# Patient Record
Sex: MALE | Race: WHITE | NOT HISPANIC OR LATINO | URBAN - NONMETROPOLITAN AREA
[De-identification: names, ages, dates, MRNs, and addresses within clinical notes are randomized per-mention and may not be internally consistent; named-entity substitution may affect disease eponyms.]

---

## 2017-01-27 ENCOUNTER — OFFICE (OUTPATIENT)
Dept: URBAN - NONMETROPOLITAN AREA CLINIC 13 | Facility: CLINIC | Age: 47
End: 2017-01-27
Payer: MEDICAID

## 2017-01-27 VITALS
HEART RATE: 75 BPM | SYSTOLIC BLOOD PRESSURE: 131 MMHG | WEIGHT: 210 LBS | HEIGHT: 66 IN | DIASTOLIC BLOOD PRESSURE: 90 MMHG

## 2017-01-27 VITALS — HEIGHT: 66 IN

## 2017-01-27 DIAGNOSIS — E11.9 TYPE 2 DIABETES MELLITUS WITHOUT COMPLICATIONS: ICD-10-CM

## 2017-01-27 DIAGNOSIS — K21.9 GASTRO-ESOPHAGEAL REFLUX DISEASE WITHOUT ESOPHAGITIS: ICD-10-CM

## 2017-01-27 DIAGNOSIS — R10.33 PERIUMBILICAL PAIN: ICD-10-CM

## 2017-01-27 DIAGNOSIS — R10.31 RIGHT LOWER QUADRANT PAIN: ICD-10-CM

## 2017-01-27 DIAGNOSIS — K59.00 CONSTIPATION, UNSPECIFIED: ICD-10-CM

## 2017-01-27 DIAGNOSIS — R13.19 OTHER DYSPHAGIA: ICD-10-CM

## 2017-01-27 DIAGNOSIS — R07.9 CHEST PAIN, UNSPECIFIED: ICD-10-CM

## 2017-01-27 DIAGNOSIS — Z79.899 OTHER LONG TERM (CURRENT) DRUG THERAPY: ICD-10-CM

## 2017-01-27 PROCEDURE — 84443 ASSAY THYROID STIM HORMONE: CPT

## 2017-01-27 PROCEDURE — 99213 OFFICE O/P EST LOW 20 MIN: CPT

## 2017-01-27 PROCEDURE — 83690 ASSAY OF LIPASE: CPT

## 2017-01-27 PROCEDURE — 82150 ASSAY OF AMYLASE: CPT

## 2017-01-27 PROCEDURE — 85025 COMPLETE CBC W/AUTO DIFF WBC: CPT

## 2017-01-27 PROCEDURE — 80053 COMPREHEN METABOLIC PANEL: CPT

## 2017-01-31 ENCOUNTER — AMBULATORY SURGICAL CENTER (OUTPATIENT)
Dept: URBAN - NONMETROPOLITAN AREA SURGERY 2 | Facility: SURGERY | Age: 47
End: 2017-01-31
Payer: MEDICAID

## 2017-01-31 ENCOUNTER — AMBULATORY SURGICAL CENTER (OUTPATIENT)
Dept: URBAN - NONMETROPOLITAN AREA SURGERY 2 | Facility: SURGERY | Age: 47
End: 2017-01-31
Payer: MEDICARE

## 2017-01-31 ENCOUNTER — OFFICE (OUTPATIENT)
Dept: URBAN - NONMETROPOLITAN AREA CLINIC 13 | Facility: CLINIC | Age: 47
End: 2017-01-31
Payer: MEDICAID

## 2017-01-31 VITALS
SYSTOLIC BLOOD PRESSURE: 115 MMHG | RESPIRATION RATE: 16 BRPM | OXYGEN SATURATION: 95 % | HEART RATE: 71 BPM | WEIGHT: 210 LBS | OXYGEN SATURATION: 97 % | SYSTOLIC BLOOD PRESSURE: 141 MMHG | HEART RATE: 75 BPM | HEIGHT: 66 IN | SYSTOLIC BLOOD PRESSURE: 131 MMHG | SYSTOLIC BLOOD PRESSURE: 118 MMHG | HEART RATE: 72 BPM | DIASTOLIC BLOOD PRESSURE: 87 MMHG | DIASTOLIC BLOOD PRESSURE: 68 MMHG | DIASTOLIC BLOOD PRESSURE: 78 MMHG | DIASTOLIC BLOOD PRESSURE: 75 MMHG | DIASTOLIC BLOOD PRESSURE: 80 MMHG | DIASTOLIC BLOOD PRESSURE: 77 MMHG | SYSTOLIC BLOOD PRESSURE: 125 MMHG | RESPIRATION RATE: 20 BRPM | HEART RATE: 69 BPM | HEART RATE: 67 BPM | RESPIRATION RATE: 18 BRPM | HEART RATE: 66 BPM | SYSTOLIC BLOOD PRESSURE: 133 MMHG

## 2017-01-31 VITALS — HEIGHT: 66 IN

## 2017-01-31 DIAGNOSIS — K31.89 OTHER DISEASES OF STOMACH AND DUODENUM: ICD-10-CM

## 2017-01-31 DIAGNOSIS — K21.9 GASTRO-ESOPHAGEAL REFLUX DISEASE WITHOUT ESOPHAGITIS: ICD-10-CM

## 2017-01-31 DIAGNOSIS — K22.2 ESOPHAGEAL OBSTRUCTION: ICD-10-CM

## 2017-01-31 DIAGNOSIS — R13.10 DYSPHAGIA, UNSPECIFIED: ICD-10-CM

## 2017-01-31 DIAGNOSIS — K31.7 POLYP OF STOMACH AND DUODENUM: ICD-10-CM

## 2017-01-31 DIAGNOSIS — R12 HEARTBURN: ICD-10-CM

## 2017-01-31 DIAGNOSIS — E87.5 HYPERKALEMIA: ICD-10-CM

## 2017-01-31 LAB
COMPLETE METABOLIC: ALBUMIN: 4.6 G/DL (ref 3.5–5.2)
COMPLETE METABOLIC: ALK. PHOS: 75 U/L (ref 40–150)
COMPLETE METABOLIC: ALT: 49 U/L (ref 0–55)
COMPLETE METABOLIC: AST: 27 U/L (ref 5–34)
COMPLETE METABOLIC: BUN: 10 MG/DL (ref 7–26)
COMPLETE METABOLIC: CALCIUM: 9.6 MG/DL (ref 8.4–10.2)
COMPLETE METABOLIC: CHLORIDE: 107 MMOL/L (ref 98–107)
COMPLETE METABOLIC: CO2: 32 MEQ/L — HIGH (ref 22–29)
COMPLETE METABOLIC: CREATININE: 0.8 MG/DL (ref 0.6–1.3)
COMPLETE METABOLIC: GLUCOSE: 89 MG/DL (ref 70–99)
COMPLETE METABOLIC: POTASSIUM: 5.1 MMOL/L (ref 3.5–5.3)
COMPLETE METABOLIC: SODIUM: 143 MMOL/L (ref 136–145)
COMPLETE METABOLIC: TOTAL BILIRUBIN: 0.5 MG/DL (ref 0.2–1.2)
COMPLETE METABOLIC: TOTAL PROTEIN: 6.9 G/DL (ref 6.4–8.3)

## 2017-01-31 PROCEDURE — 80053 COMPREHEN METABOLIC PANEL: CPT | Performed by: INTERNAL MEDICINE

## 2017-01-31 PROCEDURE — 43239 EGD BIOPSY SINGLE/MULTIPLE: CPT | Mod: 59 | Performed by: INTERNAL MEDICINE

## 2017-01-31 PROCEDURE — 43248 EGD GUIDE WIRE INSERTION: CPT | Performed by: INTERNAL MEDICINE

## 2017-01-31 PROCEDURE — 36415 COLL VENOUS BLD VENIPUNCTURE: CPT | Performed by: INTERNAL MEDICINE

## 2017-01-31 PROCEDURE — 43251 EGD REMOVE LESION SNARE: CPT | Performed by: INTERNAL MEDICINE

## 2017-01-31 PROCEDURE — 00740: CPT | Mod: QS,QZ | Performed by: REGISTERED NURSE

## 2017-01-31 PROCEDURE — 00740: CPT | Mod: QZ,QS | Performed by: REGISTERED NURSE

## 2017-01-31 RX ORDER — RANITIDINE 300 MG/1
TABLET ORAL
Qty: 180 | Refills: 1 | Status: COMPLETED
End: 2019-09-20

## 2017-01-31 RX ORDER — DOCUSATE SODIUM AND SENNOSIDES 50; 8.6 MG/1; MG/1
TABLET, FILM COATED ORAL
Qty: 60 | Refills: 5 | Status: ACTIVE
Start: 2016-04-18

## 2017-01-31 RX ORDER — PANCRELIPASE 20000; 68000; 109000 [USP'U]/1; [USP'U]/1; [USP'U]/1
CAPSULE, DELAYED RELEASE ORAL
Qty: 180 | Refills: 2 | Status: COMPLETED
End: 2017-01-31

## 2017-01-31 RX ORDER — DEXLANSOPRAZOLE 60 MG/1
CAPSULE, DELAYED RELEASE ORAL
Qty: 90 | Refills: 1 | Status: COMPLETED
End: 2017-08-07

## 2017-01-31 RX ORDER — BACLOFEN 10 MG/1
TABLET ORAL
Qty: 30 | Refills: 1 | Status: COMPLETED
End: 2017-01-31

## 2017-01-31 RX ORDER — VITAMIN B COMPLEX
TABLET ORAL
Qty: 60 | Refills: 5 | Status: ACTIVE
Start: 2016-04-20

## 2017-01-31 RX ORDER — FENOFIBRATE 145 MG/1
TABLET ORAL
Qty: 30 | Refills: 5 | Status: ACTIVE
Start: 2017-01-31

## 2017-01-31 RX ORDER — POLYETHYLENE GLYCOL 3350 17 G/17G
POWDER, FOR SOLUTION ORAL
Qty: 1 | Refills: 5 | Status: COMPLETED
Start: 2016-04-18 | End: 2020-04-29

## 2017-02-04 LAB — SURGICAL PROCEDURE: PDF REPORT: (no result)

## 2017-05-01 ENCOUNTER — OFFICE (OUTPATIENT)
Dept: URBAN - NONMETROPOLITAN AREA CLINIC 13 | Facility: CLINIC | Age: 47
End: 2017-05-01
Payer: MEDICARE

## 2017-05-01 ENCOUNTER — OFFICE (OUTPATIENT)
Dept: URBAN - NONMETROPOLITAN AREA CLINIC 13 | Facility: CLINIC | Age: 47
End: 2017-05-01
Payer: MEDICAID

## 2017-05-01 VITALS
HEIGHT: 66 IN | DIASTOLIC BLOOD PRESSURE: 77 MMHG | RESPIRATION RATE: 18 BRPM | HEART RATE: 69 BPM | SYSTOLIC BLOOD PRESSURE: 133 MMHG | WEIGHT: 203 LBS

## 2017-05-01 VITALS — HEIGHT: 66 IN

## 2017-05-01 DIAGNOSIS — K21.9 GASTRO-ESOPHAGEAL REFLUX DISEASE WITHOUT ESOPHAGITIS: ICD-10-CM

## 2017-05-01 DIAGNOSIS — K76.0 FATTY (CHANGE OF) LIVER, NOT ELSEWHERE CLASSIFIED: ICD-10-CM

## 2017-05-01 DIAGNOSIS — E78.5 HYPERLIPIDEMIA, UNSPECIFIED: ICD-10-CM

## 2017-05-01 DIAGNOSIS — Z86.010 PERSONAL HISTORY OF COLONIC POLYPS: ICD-10-CM

## 2017-05-01 DIAGNOSIS — E78.1 PURE HYPERGLYCERIDEMIA: ICD-10-CM

## 2017-05-01 DIAGNOSIS — K59.00 CONSTIPATION, UNSPECIFIED: ICD-10-CM

## 2017-05-01 DIAGNOSIS — R74.0 NONSPECIFIC ELEVATION OF LEVELS OF TRANSAMINASE AND LACTIC A: ICD-10-CM

## 2017-05-01 DIAGNOSIS — R12 HEARTBURN: ICD-10-CM

## 2017-05-01 DIAGNOSIS — K31.7 POLYP OF STOMACH AND DUODENUM: ICD-10-CM

## 2017-05-01 LAB
LIVER: ALBUMIN: 4.6 G/DL (ref 3.5–5.2)
LIVER: ALK. PHOS: 77 U/L (ref 40–150)
LIVER: ALT: 53 U/L (ref 0–55)
LIVER: AST: 48 U/L — HIGH (ref 5–34)
LIVER: DIRECT BILIRUBIN: 0.2 MG/DL (ref 0–0.5)
LIVER: TOTAL BILIRUBIN: 0.5 MG/DL (ref 0.2–1.2)
LIVER: TOTAL PROTEIN: 6.8 G/DL (ref 6.4–8.3)

## 2017-05-01 PROCEDURE — 99213 OFFICE O/P EST LOW 20 MIN: CPT

## 2017-05-01 PROCEDURE — 80076 HEPATIC FUNCTION PANEL: CPT

## 2017-08-01 ENCOUNTER — OFFICE (OUTPATIENT)
Dept: URBAN - NONMETROPOLITAN AREA CLINIC 13 | Facility: CLINIC | Age: 47
End: 2017-08-01
Payer: MEDICAID

## 2017-08-01 ENCOUNTER — OFFICE (OUTPATIENT)
Dept: URBAN - NONMETROPOLITAN AREA CLINIC 13 | Facility: CLINIC | Age: 47
End: 2017-08-01
Payer: MEDICARE

## 2017-08-01 VITALS
RESPIRATION RATE: 18 BRPM | HEART RATE: 76 BPM | WEIGHT: 203 LBS | DIASTOLIC BLOOD PRESSURE: 101 MMHG | HEIGHT: 66 IN | SYSTOLIC BLOOD PRESSURE: 149 MMHG

## 2017-08-01 VITALS — HEIGHT: 66 IN

## 2017-08-01 DIAGNOSIS — Z86.010 PERSONAL HISTORY OF COLONIC POLYPS: ICD-10-CM

## 2017-08-01 DIAGNOSIS — K21.9 GASTRO-ESOPHAGEAL REFLUX DISEASE WITHOUT ESOPHAGITIS: ICD-10-CM

## 2017-08-01 DIAGNOSIS — K31.7 POLYP OF STOMACH AND DUODENUM: ICD-10-CM

## 2017-08-01 DIAGNOSIS — K76.0 FATTY (CHANGE OF) LIVER, NOT ELSEWHERE CLASSIFIED: ICD-10-CM

## 2017-08-01 DIAGNOSIS — Z79.899 OTHER LONG TERM (CURRENT) DRUG THERAPY: ICD-10-CM

## 2017-08-01 DIAGNOSIS — R12 HEARTBURN: ICD-10-CM

## 2017-08-01 DIAGNOSIS — K59.00 CONSTIPATION, UNSPECIFIED: ICD-10-CM

## 2017-08-01 LAB
CBC EMERALD: HEMATOCRIT: 41.8 % (ref 37–47)
CBC EMERALD: HEMOGLOBIN: 13.9 G/DL — LOW (ref 14–18)
CBC EMERALD: LYMPHS %: 45.7 % — HIGH (ref 20.5–40.5)
CBC EMERALD: LYMPHS ABSOLUTE: 2.8 10*3U/L (ref 1.3–2.9)
CBC EMERALD: MCH: 29.6 PG (ref 27–32)
CBC EMERALD: MCHC: 33.3 G/DL (ref 28.5–35)
CBC EMERALD: MCV: 89.1 FL (ref 84–100)
CBC EMERALD: MONOS %: 7.1 % (ref 2–10)
CBC EMERALD: MONOS ABSOLUTE: 0.4 10*3U/L (ref 0.3–3.8)
CBC EMERALD: MPV: 10.2 FL (ref 7.4–10.4)
CBC EMERALD: NEUT. %: 47.2 % (ref 43–65)
CBC EMERALD: NEUT. ABSOLUTE: 3 10*3U/L (ref 2.2–4.8)
CBC EMERALD: PLATELETS: 184 10*3U/L (ref 130–440)
CBC EMERALD: RBC: 4.7 10*6U/L (ref 4.2–5.4)
CBC EMERALD: RDW: 13.4 % (ref 11.5–15.5)
CBC EMERALD: WBC: 6.2 10*3U/L (ref 4.5–10.5)

## 2017-08-01 PROCEDURE — 85025 COMPLETE CBC W/AUTO DIFF WBC: CPT

## 2017-08-01 PROCEDURE — 76705 ECHO EXAM OF ABDOMEN: CPT

## 2017-08-01 PROCEDURE — 99214 OFFICE O/P EST MOD 30 MIN: CPT | Mod: 25

## 2017-08-07 ENCOUNTER — AMBULATORY SURGICAL CENTER (OUTPATIENT)
Dept: URBAN - NONMETROPOLITAN AREA SURGERY 2 | Facility: SURGERY | Age: 47
End: 2017-08-07
Payer: MEDICARE

## 2017-08-07 ENCOUNTER — AMBULATORY SURGICAL CENTER (OUTPATIENT)
Dept: URBAN - NONMETROPOLITAN AREA SURGERY 2 | Facility: SURGERY | Age: 47
End: 2017-08-07
Payer: MEDICAID

## 2017-08-07 VITALS
SYSTOLIC BLOOD PRESSURE: 145 MMHG | SYSTOLIC BLOOD PRESSURE: 149 MMHG | DIASTOLIC BLOOD PRESSURE: 92 MMHG | SYSTOLIC BLOOD PRESSURE: 160 MMHG | OXYGEN SATURATION: 98 % | HEART RATE: 70 BPM | HEART RATE: 72 BPM | DIASTOLIC BLOOD PRESSURE: 80 MMHG | OXYGEN SATURATION: 99 % | HEART RATE: 62 BPM | HEART RATE: 68 BPM | RESPIRATION RATE: 18 BRPM | DIASTOLIC BLOOD PRESSURE: 89 MMHG | SYSTOLIC BLOOD PRESSURE: 132 MMHG | RESPIRATION RATE: 20 BRPM | HEIGHT: 66 IN | WEIGHT: 203 LBS | DIASTOLIC BLOOD PRESSURE: 104 MMHG | OXYGEN SATURATION: 94 % | SYSTOLIC BLOOD PRESSURE: 125 MMHG | DIASTOLIC BLOOD PRESSURE: 86 MMHG | HEART RATE: 77 BPM | SYSTOLIC BLOOD PRESSURE: 127 MMHG | DIASTOLIC BLOOD PRESSURE: 77 MMHG | RESPIRATION RATE: 16 BRPM | SYSTOLIC BLOOD PRESSURE: 118 MMHG | HEART RATE: 66 BPM

## 2017-08-07 DIAGNOSIS — K25.9 GASTRIC ULCER, UNSPECIFIED AS ACUTE OR CHRONIC, WITHOUT HEMO: ICD-10-CM

## 2017-08-07 DIAGNOSIS — K31.7 POLYP OF STOMACH AND DUODENUM: ICD-10-CM

## 2017-08-07 DIAGNOSIS — K29.70 GASTRITIS, UNSPECIFIED, WITHOUT BLEEDING: ICD-10-CM

## 2017-08-07 DIAGNOSIS — K22.70 BARRETT'S ESOPHAGUS WITHOUT DYSPLASIA: ICD-10-CM

## 2017-08-07 PROBLEM — D36.9: Status: ACTIVE | Noted: 2017-08-07

## 2017-08-07 PROCEDURE — 00740: CPT | Mod: QS,QZ

## 2017-08-07 PROCEDURE — 43239 EGD BIOPSY SINGLE/MULTIPLE: CPT | Mod: 59 | Performed by: INTERNAL MEDICINE

## 2017-08-07 PROCEDURE — 43251 EGD REMOVE LESION SNARE: CPT | Performed by: INTERNAL MEDICINE

## 2017-08-07 RX ORDER — FENOFIBRATE 145 MG/1
TABLET ORAL
Qty: 90 | Refills: 1 | Status: COMPLETED
Start: 2017-08-07 | End: 2018-01-26

## 2017-08-07 RX ORDER — POLYETHYLENE GLYCOL 3350 17 G/17G
POWDER, FOR SOLUTION ORAL
Qty: 1 | Refills: 5 | Status: COMPLETED
Start: 2016-04-18 | End: 2020-04-29

## 2017-08-07 RX ORDER — DEXLANSOPRAZOLE 60 MG/1
CAPSULE, DELAYED RELEASE ORAL
Qty: 90 | Refills: 1 | Status: COMPLETED
End: 2017-08-07

## 2017-08-07 RX ORDER — VITAMIN B COMPLEX
TABLET ORAL
Qty: 60 | Refills: 5 | Status: ACTIVE
Start: 2016-04-20

## 2017-08-07 RX ORDER — OMEPRAZOLE 40 MG/1
CAPSULE, DELAYED RELEASE ORAL
Qty: 30 | Refills: 0 | Status: ACTIVE
Start: 2017-08-07

## 2017-08-07 RX ORDER — DOCUSATE SODIUM AND SENNOSIDES 50; 8.6 MG/1; MG/1
TABLET, FILM COATED ORAL
Qty: 60 | Refills: 5 | Status: ACTIVE
Start: 2016-04-18

## 2017-08-07 RX ORDER — RANITIDINE 300 MG/1
TABLET ORAL
Qty: 180 | Refills: 1 | Status: COMPLETED
End: 2019-09-20

## 2017-08-11 LAB — SURGICAL PROCEDURE: PDF REPORT: (no result)

## 2018-01-24 ENCOUNTER — OFFICE (OUTPATIENT)
Dept: URBAN - NONMETROPOLITAN AREA CLINIC 13 | Facility: CLINIC | Age: 48
End: 2018-01-24
Payer: MEDICAID

## 2018-01-24 ENCOUNTER — OFFICE (OUTPATIENT)
Dept: URBAN - NONMETROPOLITAN AREA CLINIC 13 | Facility: CLINIC | Age: 48
End: 2018-01-24
Payer: MEDICARE

## 2018-01-24 VITALS
DIASTOLIC BLOOD PRESSURE: 76 MMHG | HEART RATE: 63 BPM | SYSTOLIC BLOOD PRESSURE: 117 MMHG | HEIGHT: 66 IN | WEIGHT: 206 LBS

## 2018-01-24 VITALS — HEIGHT: 66 IN

## 2018-01-24 DIAGNOSIS — K31.7 POLYP OF STOMACH AND DUODENUM: ICD-10-CM

## 2018-01-24 DIAGNOSIS — K21.9 GASTRO-ESOPHAGEAL REFLUX DISEASE WITHOUT ESOPHAGITIS: ICD-10-CM

## 2018-01-24 DIAGNOSIS — E66.3 OVERWEIGHT: ICD-10-CM

## 2018-01-24 DIAGNOSIS — K22.70 BARRETT'S ESOPHAGUS WITHOUT DYSPLASIA: ICD-10-CM

## 2018-01-24 DIAGNOSIS — K86.81 EXOCRINE PANCREATIC INSUFFICIENCY: ICD-10-CM

## 2018-01-24 DIAGNOSIS — K59.00 CONSTIPATION, UNSPECIFIED: ICD-10-CM

## 2018-01-24 DIAGNOSIS — E78.1 PURE HYPERGLYCERIDEMIA: ICD-10-CM

## 2018-01-24 DIAGNOSIS — K76.0 FATTY (CHANGE OF) LIVER, NOT ELSEWHERE CLASSIFIED: ICD-10-CM

## 2018-01-24 DIAGNOSIS — Z86.010 PERSONAL HISTORY OF COLONIC POLYPS: ICD-10-CM

## 2018-01-24 DIAGNOSIS — R12 HEARTBURN: ICD-10-CM

## 2018-01-24 LAB
CBC SYSMEX: HEMATOCRIT: 41 % (ref 37–47)
CBC SYSMEX: HEMOGLOBIN: 13.9 G/DL — LOW (ref 14–18)
CBC SYSMEX: LYMPHS %: 43 % — HIGH (ref 20.5–40.5)
CBC SYSMEX: LYMPHS ABSOLUTE: 3.1 10*3U/L — HIGH (ref 1.3–2.9)
CBC SYSMEX: MCH: 30 PG (ref 27–32)
CBC SYSMEX: MCHC: 33.9 G/DL (ref 28.5–35)
CBC SYSMEX: MCV: 88.4 FL (ref 84–100)
CBC SYSMEX: MONOS %: 6.1 % (ref 2–10)
CBC SYSMEX: MONOS ABSOLUTE: 0.4 10*3U/L (ref 0.3–3.8)
CBC SYSMEX: MPV: 9.9 FL (ref 7.4–10.4)
CBC SYSMEX: NEUT. %: 50.9 % (ref 43–65)
CBC SYSMEX: NEUT. ABSOLUTE: 3.8 10*3U/L (ref 2.2–4.8)
CBC SYSMEX: PLATELETS: 214 10*3U/L (ref 130–440)
CBC SYSMEX: RBC: 4.6 10*6U/L (ref 4.2–5.4)
CBC SYSMEX: RDW: 12.6 % (ref 11.5–15.5)
CBC SYSMEX: WBC: 7.3 10*3U/L (ref 4.5–10.5)
COMPLETE METABOLIC: ALBUMIN: 4.7 G/DL (ref 3.5–5.2)
COMPLETE METABOLIC: ALK. PHOS: 74 U/L (ref 40–150)
COMPLETE METABOLIC: ALT: 26 U/L (ref 0–55)
COMPLETE METABOLIC: AST: 23 U/L (ref 5–34)
COMPLETE METABOLIC: BUN: 18 MG/DL (ref 7–26)
COMPLETE METABOLIC: CALCIUM: 10.1 MG/DL (ref 8.4–10.2)
COMPLETE METABOLIC: CHLORIDE: 106 MMOL/L (ref 98–107)
COMPLETE METABOLIC: CO2: 30 MEQ/L — HIGH (ref 22–29)
COMPLETE METABOLIC: CREATININE: 0.89 MG/DL (ref 0.57–1.25)
COMPLETE METABOLIC: GFR - AFRICAN AMERICAN: 118.5 (ref 59–200)
COMPLETE METABOLIC: GFR - NON AFRICAN AMERICAN: 97.8 (ref 59–200)
COMPLETE METABOLIC: GLUCOSE: 104 MG/DL — HIGH (ref 70–99)
COMPLETE METABOLIC: POTASSIUM: 5 MMOL/L (ref 3.5–5.3)
COMPLETE METABOLIC: SODIUM: 142 MMOL/L (ref 136–145)
COMPLETE METABOLIC: TOTAL BILIRUBIN: 0.8 MG/DL (ref 0.2–1.2)
COMPLETE METABOLIC: TOTAL PROTEIN: 7 G/DL (ref 6.4–8.3)

## 2018-01-24 PROCEDURE — 99214 OFFICE O/P EST MOD 30 MIN: CPT

## 2018-01-24 PROCEDURE — 85025 COMPLETE CBC W/AUTO DIFF WBC: CPT

## 2018-01-24 PROCEDURE — 80053 COMPREHEN METABOLIC PANEL: CPT

## 2018-01-24 RX ORDER — PANCRELIPASE 20000; 68000; 109000 [USP'U]/1; [USP'U]/1; [USP'U]/1
CAPSULE, DELAYED RELEASE ORAL
Qty: 200 | Refills: 7 | Status: COMPLETED
End: 2018-10-24

## 2018-02-02 ENCOUNTER — AMBULATORY SURGICAL CENTER (OUTPATIENT)
Dept: URBAN - NONMETROPOLITAN AREA SURGERY 2 | Facility: SURGERY | Age: 48
End: 2018-02-02
Payer: MEDICARE

## 2018-02-02 ENCOUNTER — OFFICE (OUTPATIENT)
Dept: URBAN - NONMETROPOLITAN AREA CLINIC 13 | Facility: CLINIC | Age: 48
End: 2018-02-02
Payer: MEDICAID

## 2018-02-02 ENCOUNTER — AMBULATORY SURGICAL CENTER (OUTPATIENT)
Dept: URBAN - NONMETROPOLITAN AREA SURGERY 2 | Facility: SURGERY | Age: 48
End: 2018-02-02
Payer: MEDICAID

## 2018-02-02 VITALS
SYSTOLIC BLOOD PRESSURE: 132 MMHG | RESPIRATION RATE: 20 BRPM | HEART RATE: 68 BPM | OXYGEN SATURATION: 93 % | OXYGEN SATURATION: 99 % | DIASTOLIC BLOOD PRESSURE: 74 MMHG | OXYGEN SATURATION: 98 % | SYSTOLIC BLOOD PRESSURE: 127 MMHG | HEIGHT: 66 IN | SYSTOLIC BLOOD PRESSURE: 119 MMHG | DIASTOLIC BLOOD PRESSURE: 77 MMHG | RESPIRATION RATE: 18 BRPM | HEART RATE: 66 BPM | RESPIRATION RATE: 16 BRPM | WEIGHT: 206 LBS | HEART RATE: 64 BPM | DIASTOLIC BLOOD PRESSURE: 87 MMHG | HEART RATE: 78 BPM | DIASTOLIC BLOOD PRESSURE: 93 MMHG | HEART RATE: 71 BPM | SYSTOLIC BLOOD PRESSURE: 142 MMHG | SYSTOLIC BLOOD PRESSURE: 137 MMHG | HEART RATE: 60 BPM

## 2018-02-02 DIAGNOSIS — K22.70 BARRETT'S ESOPHAGUS WITHOUT DYSPLASIA: ICD-10-CM

## 2018-02-02 DIAGNOSIS — K21.9 GASTRO-ESOPHAGEAL REFLUX DISEASE WITHOUT ESOPHAGITIS: ICD-10-CM

## 2018-02-02 DIAGNOSIS — K29.70 GASTRITIS, UNSPECIFIED, WITHOUT BLEEDING: ICD-10-CM

## 2018-02-02 DIAGNOSIS — K31.9 DISEASE OF STOMACH AND DUODENUM, UNSPECIFIED: ICD-10-CM

## 2018-02-02 DIAGNOSIS — R13.10 DYSPHAGIA, UNSPECIFIED: ICD-10-CM

## 2018-02-02 PROBLEM — D36.9: Status: ACTIVE | Noted: 2018-02-02

## 2018-02-02 PROBLEM — K92.1: Status: ACTIVE | Noted: 2018-02-02

## 2018-02-02 PROCEDURE — 43248 EGD GUIDE WIRE INSERTION: CPT | Performed by: INTERNAL MEDICINE

## 2018-02-02 PROCEDURE — 43239 EGD BIOPSY SINGLE/MULTIPLE: CPT | Mod: 59 | Performed by: INTERNAL MEDICINE

## 2018-02-02 PROCEDURE — 00731 ANES UPR GI NDSC PX NOS: CPT | Mod: QS,QZ

## 2018-02-02 PROCEDURE — 88305 TISSUE EXAM BY PATHOLOGIST: CPT | Mod: TC | Performed by: INTERNAL MEDICINE

## 2018-02-02 RX ORDER — POLYETHYLENE GLYCOL 3350 17 G/17G
POWDER, FOR SOLUTION ORAL
Qty: 1 | Refills: 5 | Status: COMPLETED
Start: 2016-04-18 | End: 2020-04-29

## 2018-02-02 RX ORDER — DOCUSATE SODIUM AND SENNOSIDES 50; 8.6 MG/1; MG/1
TABLET, FILM COATED ORAL
Qty: 60 | Refills: 5 | Status: ACTIVE
Start: 2016-04-18

## 2018-02-07 LAB — SURGICAL PROCEDURE: PDF REPORT: (no result)

## 2018-02-11 ENCOUNTER — INPATIENT HOSPITAL (OUTPATIENT)
Dept: RURAL HOSPITAL 7 | Facility: HOSPITAL | Age: 48
End: 2018-02-11
Payer: MEDICARE

## 2018-02-11 DIAGNOSIS — K57.32 DIVERTICULITIS OF LARGE INTESTINE WITHOUT PERFORATION OR ABS: ICD-10-CM

## 2018-02-11 PROCEDURE — 99222 1ST HOSP IP/OBS MODERATE 55: CPT | Performed by: INTERNAL MEDICINE

## 2018-02-11 PROCEDURE — 99253 IP/OBS CNSLTJ NEW/EST LOW 45: CPT | Performed by: INTERNAL MEDICINE

## 2018-02-11 PROCEDURE — 99218: CPT | Performed by: INTERNAL MEDICINE

## 2018-02-11 PROCEDURE — 99213 OFFICE O/P EST LOW 20 MIN: CPT | Performed by: INTERNAL MEDICINE

## 2018-02-22 ENCOUNTER — OFFICE (OUTPATIENT)
Dept: URBAN - NONMETROPOLITAN AREA CLINIC 13 | Facility: CLINIC | Age: 48
End: 2018-02-22
Payer: MEDICAID

## 2018-02-22 VITALS — HEIGHT: 66 IN

## 2018-02-22 VITALS
HEART RATE: 69 BPM | DIASTOLIC BLOOD PRESSURE: 75 MMHG | HEIGHT: 66 IN | WEIGHT: 208 LBS | SYSTOLIC BLOOD PRESSURE: 114 MMHG

## 2018-02-22 DIAGNOSIS — K21.9 GASTRO-ESOPHAGEAL REFLUX DISEASE WITHOUT ESOPHAGITIS: ICD-10-CM

## 2018-02-22 DIAGNOSIS — K59.00 CONSTIPATION, UNSPECIFIED: ICD-10-CM

## 2018-02-22 DIAGNOSIS — K57.92 DIVERTICULITIS OF INTESTINE, PART UNSPECIFIED, WITHOUT PERFO: ICD-10-CM

## 2018-02-22 DIAGNOSIS — K76.0 FATTY (CHANGE OF) LIVER, NOT ELSEWHERE CLASSIFIED: ICD-10-CM

## 2018-02-22 DIAGNOSIS — E78.1 PURE HYPERGLYCERIDEMIA: ICD-10-CM

## 2018-02-22 LAB
CBC SYSMEX: HEMATOCRIT: 38.9 % (ref 37–47)
CBC SYSMEX: HEMOGLOBIN: 13.2 G/DL — LOW (ref 14–18)
CBC SYSMEX: LYMPHS %: 45.4 % — HIGH (ref 20.5–40.5)
CBC SYSMEX: LYMPHS ABSOLUTE: 2.7 10*3U/L (ref 1.3–2.9)
CBC SYSMEX: MCH: 30.1 PG (ref 27–32)
CBC SYSMEX: MCHC: 33.9 G/DL (ref 28.5–35)
CBC SYSMEX: MCV: 88.8 FL (ref 84–100)
CBC SYSMEX: MONOS %: 8.1 % (ref 2–10)
CBC SYSMEX: MONOS ABSOLUTE: 0.5 10*3U/L (ref 0.3–3.8)
CBC SYSMEX: MPV: 9 FL (ref 7.4–10.4)
CBC SYSMEX: NEUT. %: 46.5 % (ref 43–65)
CBC SYSMEX: NEUT. ABSOLUTE: 2.8 10*3U/L (ref 2.2–4.8)
CBC SYSMEX: PLATELETS: 243 10*3U/L (ref 130–440)
CBC SYSMEX: RBC: 4.4 10*6U/L (ref 4.2–5.4)
CBC SYSMEX: RDW: 13.1 % (ref 11.5–15.5)
CBC SYSMEX: WBC: 6 10*3U/L (ref 4.5–10.5)
COMPLETE METABOLIC: ALBUMIN: 4.2 G/DL (ref 3.5–5.2)
COMPLETE METABOLIC: ALK. PHOS: 70 U/L (ref 40–150)
COMPLETE METABOLIC: ALT: 25 U/L (ref 0–55)
COMPLETE METABOLIC: AST: 24 U/L (ref 5–34)
COMPLETE METABOLIC: BUN: 10 MG/DL (ref 7–26)
COMPLETE METABOLIC: CALCIUM: 9.1 MG/DL (ref 8.4–10.2)
COMPLETE METABOLIC: CHLORIDE: 114 MMOL/L — HIGH (ref 98–107)
COMPLETE METABOLIC: CO2: 30 MEQ/L — HIGH (ref 22–29)
COMPLETE METABOLIC: CREATININE: 0.8 MG/DL (ref 0.57–1.25)
COMPLETE METABOLIC: GFR - AFRICAN AMERICAN: 134.1 (ref 59–200)
COMPLETE METABOLIC: GFR - NON AFRICAN AMERICAN: 110.6 (ref 59–200)
COMPLETE METABOLIC: GLUCOSE: 98 MG/DL (ref 70–99)
COMPLETE METABOLIC: POTASSIUM: 4.8 MMOL/L (ref 3.5–5.3)
COMPLETE METABOLIC: SODIUM: 137 MMOL/L (ref 136–145)
COMPLETE METABOLIC: TOTAL BILIRUBIN: 0.4 MG/DL (ref 0.2–1.2)
COMPLETE METABOLIC: TOTAL PROTEIN: 6.5 G/DL (ref 6.4–8.3)

## 2018-02-22 PROCEDURE — 99213 OFFICE O/P EST LOW 20 MIN: CPT | Performed by: NURSE PRACTITIONER

## 2018-02-22 PROCEDURE — 85025 COMPLETE CBC W/AUTO DIFF WBC: CPT | Performed by: NURSE PRACTITIONER

## 2018-02-22 PROCEDURE — 36415 COLL VENOUS BLD VENIPUNCTURE: CPT | Performed by: NURSE PRACTITIONER

## 2018-02-22 PROCEDURE — 80053 COMPREHEN METABOLIC PANEL: CPT | Performed by: NURSE PRACTITIONER

## 2018-05-25 ENCOUNTER — OFFICE (OUTPATIENT)
Dept: URBAN - NONMETROPOLITAN AREA CLINIC 13 | Facility: CLINIC | Age: 48
End: 2018-05-25
Payer: MEDICARE

## 2018-05-25 ENCOUNTER — OFFICE (OUTPATIENT)
Dept: URBAN - NONMETROPOLITAN AREA CLINIC 13 | Facility: CLINIC | Age: 48
End: 2018-05-25
Payer: MEDICAID

## 2018-05-25 VITALS
DIASTOLIC BLOOD PRESSURE: 77 MMHG | WEIGHT: 202 LBS | HEIGHT: 66 IN | HEART RATE: 75 BPM | SYSTOLIC BLOOD PRESSURE: 120 MMHG

## 2018-05-25 VITALS — HEIGHT: 66 IN

## 2018-05-25 DIAGNOSIS — K59.00 CONSTIPATION, UNSPECIFIED: ICD-10-CM

## 2018-05-25 DIAGNOSIS — R10.12 LEFT UPPER QUADRANT PAIN: ICD-10-CM

## 2018-05-25 DIAGNOSIS — K22.70 BARRETT'S ESOPHAGUS WITHOUT DYSPLASIA: ICD-10-CM

## 2018-05-25 DIAGNOSIS — K21.9 GASTRO-ESOPHAGEAL REFLUX DISEASE WITHOUT ESOPHAGITIS: ICD-10-CM

## 2018-05-25 DIAGNOSIS — R74.0 NONSPECIFIC ELEVATION OF LEVELS OF TRANSAMINASE AND LACTIC A: ICD-10-CM

## 2018-05-25 DIAGNOSIS — K76.0 FATTY (CHANGE OF) LIVER, NOT ELSEWHERE CLASSIFIED: ICD-10-CM

## 2018-05-25 LAB
AMYLASE: 80.2 U/L (ref 25–125)
CBC SYSMEX: HEMATOCRIT: 40.7 % (ref 37–47)
CBC SYSMEX: HEMOGLOBIN: 13.8 G/DL — LOW (ref 14–18)
CBC SYSMEX: LYMPHS %: 40 % (ref 20.5–40.5)
CBC SYSMEX: LYMPHS ABSOLUTE: 3.5 10*3U/L — HIGH (ref 1.3–2.9)
CBC SYSMEX: MCH: 30.1 PG (ref 27–32)
CBC SYSMEX: MCHC: 33.9 G/DL (ref 28.5–35)
CBC SYSMEX: MCV: 88.7 FL (ref 84–100)
CBC SYSMEX: MONOS %: 5.7 % (ref 2–10)
CBC SYSMEX: MONOS ABSOLUTE: 0.5 10*3U/L (ref 0.3–3.8)
CBC SYSMEX: MPV: 10.1 FL (ref 7.4–10.4)
CBC SYSMEX: NEUT. %: 54.3 % (ref 43–67)
CBC SYSMEX: NEUT. ABSOLUTE: 4.7 10*3U/L (ref 2.2–4.8)
CBC SYSMEX: PLATELETS: 247 10*3U/L (ref 130–440)
CBC SYSMEX: RBC: 4.6 10*6U/L (ref 4.2–5.4)
CBC SYSMEX: RDW: 12.8 % (ref 11.5–15.5)
CBC SYSMEX: WBC: 8.7 10*3U/L (ref 4.5–10.5)
COMPLETE METABOLIC: ALBUMIN: 4.6 G/DL (ref 3.5–5.2)
COMPLETE METABOLIC: ALK. PHOS: 82 U/L (ref 40–150)
COMPLETE METABOLIC: ALT: 24 U/L (ref 0–55)
COMPLETE METABOLIC: AST: 18 U/L (ref 5–34)
COMPLETE METABOLIC: BUN: 15 MG/DL (ref 7–26)
COMPLETE METABOLIC: CALCIUM: 9.9 MG/DL (ref 8.4–10.2)
COMPLETE METABOLIC: CHLORIDE: 107 MMOL/L (ref 98–107)
COMPLETE METABOLIC: CO2: 29 MEQ/L (ref 22–29)
COMPLETE METABOLIC: CREATININE: 0.79 MG/DL (ref 0.57–1.25)
COMPLETE METABOLIC: GFR - AFRICAN AMERICAN: 136 (ref 59–200)
COMPLETE METABOLIC: GFR - NON AFRICAN AMERICAN: 112.2 (ref 59–200)
COMPLETE METABOLIC: GLUCOSE: 111 MG/DL — HIGH (ref 70–99)
COMPLETE METABOLIC: POTASSIUM: 5 MMOL/L (ref 3.5–5.3)
COMPLETE METABOLIC: SODIUM: 140 MMOL/L (ref 136–145)
COMPLETE METABOLIC: TOTAL BILIRUBIN: 0.4 MG/DL (ref 0.2–1.2)
COMPLETE METABOLIC: TOTAL PROTEIN: 6.9 G/DL (ref 6.4–8.3)
LIPASE: 78 U/L (ref 8–78)

## 2018-05-25 PROCEDURE — 82150 ASSAY OF AMYLASE: CPT | Performed by: NURSE PRACTITIONER

## 2018-05-25 PROCEDURE — 80053 COMPREHEN METABOLIC PANEL: CPT | Performed by: NURSE PRACTITIONER

## 2018-05-25 PROCEDURE — 83690 ASSAY OF LIPASE: CPT | Performed by: NURSE PRACTITIONER

## 2018-05-25 PROCEDURE — 36415 COLL VENOUS BLD VENIPUNCTURE: CPT | Performed by: NURSE PRACTITIONER

## 2018-05-25 PROCEDURE — 85025 COMPLETE CBC W/AUTO DIFF WBC: CPT | Performed by: NURSE PRACTITIONER

## 2018-05-25 PROCEDURE — 99213 OFFICE O/P EST LOW 20 MIN: CPT | Performed by: NURSE PRACTITIONER

## 2018-10-24 ENCOUNTER — OFFICE (OUTPATIENT)
Dept: URBAN - NONMETROPOLITAN AREA CLINIC 13 | Facility: CLINIC | Age: 48
End: 2018-10-24
Payer: MEDICAID

## 2018-10-24 VITALS — HEIGHT: 66 IN

## 2018-10-24 VITALS
HEART RATE: 77 BPM | SYSTOLIC BLOOD PRESSURE: 124 MMHG | HEIGHT: 66 IN | OXYGEN SATURATION: 98 % | DIASTOLIC BLOOD PRESSURE: 77 MMHG | WEIGHT: 198 LBS

## 2018-10-24 DIAGNOSIS — E78.1 PURE HYPERGLYCERIDEMIA: ICD-10-CM

## 2018-10-24 DIAGNOSIS — K59.00 CONSTIPATION, UNSPECIFIED: ICD-10-CM

## 2018-10-24 DIAGNOSIS — R11.0 NAUSEA: ICD-10-CM

## 2018-10-24 DIAGNOSIS — K76.0 FATTY (CHANGE OF) LIVER, NOT ELSEWHERE CLASSIFIED: ICD-10-CM

## 2018-10-24 DIAGNOSIS — R11.10 VOMITING, UNSPECIFIED: ICD-10-CM

## 2018-10-24 DIAGNOSIS — R13.19 OTHER DYSPHAGIA: ICD-10-CM

## 2018-10-24 DIAGNOSIS — Z86.010 PERSONAL HISTORY OF COLONIC POLYPS: ICD-10-CM

## 2018-10-24 DIAGNOSIS — K21.9 GASTRO-ESOPHAGEAL REFLUX DISEASE WITHOUT ESOPHAGITIS: ICD-10-CM

## 2018-10-24 DIAGNOSIS — E78.5 HYPERLIPIDEMIA, UNSPECIFIED: ICD-10-CM

## 2018-10-24 DIAGNOSIS — K22.70 BARRETT'S ESOPHAGUS WITHOUT DYSPLASIA: ICD-10-CM

## 2018-10-24 DIAGNOSIS — Z01.812 ENCOUNTER FOR PREPROCEDURAL LABORATORY EXAMINATION: ICD-10-CM

## 2018-10-24 DIAGNOSIS — E11.9 TYPE 2 DIABETES MELLITUS WITHOUT COMPLICATIONS: ICD-10-CM

## 2018-10-24 LAB
CBC SYSMEX: HEMATOCRIT: 40 % (ref 37–47)
CBC SYSMEX: HEMOGLOBIN: 13.6 G/DL — LOW (ref 14–18)
CBC SYSMEX: LYMPHS %: 38.1 % (ref 20.5–40.5)
CBC SYSMEX: LYMPHS ABSOLUTE: 2.8 10*3U/L (ref 1.3–2.9)
CBC SYSMEX: MCH: 29.9 PG (ref 27–32)
CBC SYSMEX: MCHC: 34 G/DL (ref 28.5–35)
CBC SYSMEX: MCV: 87.9 FL (ref 84–100)
CBC SYSMEX: MONOS %: 7.2 % (ref 2–10)
CBC SYSMEX: MONOS ABSOLUTE: 0.5 10*3U/L (ref 0.3–3.8)
CBC SYSMEX: MPV: 10.3 FL (ref 8.3–11.9)
CBC SYSMEX: NEUT. %: 54.7 % (ref 43–67)
CBC SYSMEX: NEUT. ABSOLUTE: 4 10*3U/L (ref 2.2–4.8)
CBC SYSMEX: PLATELETS: 228 10*3U/L (ref 130–440)
CBC SYSMEX: RBC: 4.6 10*6U/L (ref 4.2–5.4)
CBC SYSMEX: RDW: 12.6 % (ref 11.5–15.5)
CBC SYSMEX: WBC: 7.3 10*3U/L (ref 4.5–10.5)
COMPLETE METABOLIC: ALBUMIN: 4.6 G/DL (ref 3.5–5.2)
COMPLETE METABOLIC: ALK. PHOS: 80 U/L (ref 40–150)
COMPLETE METABOLIC: ALT: 24 U/L (ref 0–55)
COMPLETE METABOLIC: AST: 27 U/L (ref 5–34)
COMPLETE METABOLIC: BUN: 16 MG/DL (ref 7–26)
COMPLETE METABOLIC: CALCIUM: 10.1 MG/DL (ref 8.4–10.3)
COMPLETE METABOLIC: CHLORIDE: 105 MMOL/L (ref 98–107)
COMPLETE METABOLIC: CO2: 28 MEQ/L (ref 22–29)
COMPLETE METABOLIC: CREATININE: 0.78 MG/DL (ref 0.57–1.25)
COMPLETE METABOLIC: GFR - AFRICAN AMERICAN: 138 (ref 59–200)
COMPLETE METABOLIC: GFR - NON AFRICAN AMERICAN: 113.9 (ref 59–200)
COMPLETE METABOLIC: GLUCOSE: 88 MG/DL (ref 70–99)
COMPLETE METABOLIC: POTASSIUM: 4.1 MMOL/L (ref 3.5–5.3)
COMPLETE METABOLIC: SODIUM: 141 MMOL/L (ref 136–145)
COMPLETE METABOLIC: TOTAL BILIRUBIN: 0.6 MG/DL (ref 0.2–1.2)
COMPLETE METABOLIC: TOTAL PROTEIN: 7.4 G/DL (ref 6.4–8.3)

## 2018-10-24 PROCEDURE — 85025 COMPLETE CBC W/AUTO DIFF WBC: CPT

## 2018-10-24 PROCEDURE — 76705 ECHO EXAM OF ABDOMEN: CPT

## 2018-10-24 PROCEDURE — 80053 COMPREHEN METABOLIC PANEL: CPT

## 2018-10-24 PROCEDURE — 99214 OFFICE O/P EST MOD 30 MIN: CPT | Mod: 25

## 2018-10-24 PROCEDURE — 36415 COLL VENOUS BLD VENIPUNCTURE: CPT

## 2018-10-24 RX ORDER — PANCRELIPASE LIPASE, PANCRELIPASE PROTEASE, PANCRELIPASE AMYLASE 40000; 126000; 168000 [USP'U]/1; [USP'U]/1; [USP'U]/1
CAPSULE, DELAYED RELEASE ORAL
Qty: 90 | Refills: 0 | Status: ACTIVE
Start: 2018-10-24

## 2018-11-13 ENCOUNTER — OFFICE (OUTPATIENT)
Dept: URBAN - NONMETROPOLITAN AREA CLINIC 13 | Facility: CLINIC | Age: 48
End: 2018-11-13
Payer: MEDICAID

## 2018-11-13 ENCOUNTER — AMBULATORY SURGICAL CENTER (OUTPATIENT)
Dept: URBAN - NONMETROPOLITAN AREA SURGERY 2 | Facility: SURGERY | Age: 48
End: 2018-11-13
Payer: MEDICAID

## 2018-11-13 ENCOUNTER — AMBULATORY SURGICAL CENTER (OUTPATIENT)
Dept: URBAN - NONMETROPOLITAN AREA SURGERY 2 | Facility: SURGERY | Age: 48
End: 2018-11-13
Payer: MEDICARE

## 2018-11-13 VITALS
HEART RATE: 72 BPM | DIASTOLIC BLOOD PRESSURE: 49 MMHG | OXYGEN SATURATION: 98 % | DIASTOLIC BLOOD PRESSURE: 74 MMHG | OXYGEN SATURATION: 97 % | SYSTOLIC BLOOD PRESSURE: 111 MMHG | SYSTOLIC BLOOD PRESSURE: 119 MMHG | SYSTOLIC BLOOD PRESSURE: 126 MMHG | SYSTOLIC BLOOD PRESSURE: 132 MMHG | DIASTOLIC BLOOD PRESSURE: 69 MMHG | HEART RATE: 64 BPM | DIASTOLIC BLOOD PRESSURE: 113 MMHG | OXYGEN SATURATION: 99 % | SYSTOLIC BLOOD PRESSURE: 108 MMHG | WEIGHT: 198 LBS | RESPIRATION RATE: 20 BRPM | OXYGEN SATURATION: 100 % | HEART RATE: 62 BPM | DIASTOLIC BLOOD PRESSURE: 80 MMHG | DIASTOLIC BLOOD PRESSURE: 75 MMHG | HEART RATE: 69 BPM | HEIGHT: 66 IN | SYSTOLIC BLOOD PRESSURE: 139 MMHG | SYSTOLIC BLOOD PRESSURE: 153 MMHG | OXYGEN SATURATION: 94 % | DIASTOLIC BLOOD PRESSURE: 76 MMHG | RESPIRATION RATE: 18 BRPM | HEART RATE: 81 BPM

## 2018-11-13 DIAGNOSIS — K21.0 GASTRO-ESOPHAGEAL REFLUX DISEASE WITH ESOPHAGITIS: ICD-10-CM

## 2018-11-13 DIAGNOSIS — K29.70 GASTRITIS, UNSPECIFIED, WITHOUT BLEEDING: ICD-10-CM

## 2018-11-13 DIAGNOSIS — K31.89 OTHER DISEASES OF STOMACH AND DUODENUM: ICD-10-CM

## 2018-11-13 DIAGNOSIS — R13.10 DYSPHAGIA, UNSPECIFIED: ICD-10-CM

## 2018-11-13 DIAGNOSIS — K22.70 BARRETT'S ESOPHAGUS WITHOUT DYSPLASIA: ICD-10-CM

## 2018-11-13 PROBLEM — R11.2 NAUSEA WITH VOMITING: Status: ACTIVE | Noted: 2018-11-13

## 2018-11-13 PROCEDURE — 43239 EGD BIOPSY SINGLE/MULTIPLE: CPT | Mod: 59 | Performed by: INTERNAL MEDICINE

## 2018-11-13 PROCEDURE — 00731 ANES UPR GI NDSC PX NOS: CPT | Mod: QS,QZ

## 2018-11-13 PROCEDURE — 88305 TISSUE EXAM BY PATHOLOGIST: CPT | Mod: TC | Performed by: INTERNAL MEDICINE

## 2018-11-13 PROCEDURE — 43248 EGD GUIDE WIRE INSERTION: CPT | Performed by: INTERNAL MEDICINE

## 2018-11-13 RX ORDER — DOCUSATE SODIUM AND SENNOSIDES 50; 8.6 MG/1; MG/1
TABLET, FILM COATED ORAL
Qty: 60 | Refills: 5 | Status: ACTIVE
Start: 2016-04-18

## 2018-11-13 RX ORDER — OMEPRAZOLE 40 MG/1
CAPSULE, DELAYED RELEASE ORAL
Qty: 30 | Refills: 0 | Status: ACTIVE
Start: 2017-08-07

## 2018-11-13 RX ORDER — RANITIDINE 300 MG/1
TABLET ORAL
Qty: 180 | Refills: 1 | Status: COMPLETED
End: 2019-09-20

## 2018-11-13 RX ORDER — FENOFIBRATE 160 MG/1
TABLET, FILM COATED ORAL
Qty: 30 | Refills: 3 | Status: ACTIVE

## 2018-11-13 RX ORDER — POLYETHYLENE GLYCOL 3350 17 G/17G
POWDER, FOR SOLUTION ORAL
Qty: 1 | Refills: 5 | Status: COMPLETED
Start: 2016-04-18 | End: 2020-04-29

## 2018-11-13 RX ORDER — VITAMIN B COMPLEX
TABLET ORAL
Qty: 60 | Refills: 5 | Status: ACTIVE
Start: 2016-04-20

## 2018-11-15 LAB — SURGICAL PROCEDURE: PDF REPORT: (no result)

## 2019-02-13 ENCOUNTER — OFFICE (OUTPATIENT)
Dept: URBAN - NONMETROPOLITAN AREA CLINIC 13 | Facility: CLINIC | Age: 49
End: 2019-02-13
Payer: MEDICAID

## 2019-02-13 ENCOUNTER — OFFICE (OUTPATIENT)
Dept: URBAN - NONMETROPOLITAN AREA CLINIC 13 | Facility: CLINIC | Age: 49
End: 2019-02-13
Payer: MEDICARE

## 2019-02-13 VITALS
OXYGEN SATURATION: 96 % | HEART RATE: 64 BPM | SYSTOLIC BLOOD PRESSURE: 138 MMHG | HEIGHT: 66 IN | WEIGHT: 162 LBS | DIASTOLIC BLOOD PRESSURE: 89 MMHG

## 2019-02-13 VITALS — HEIGHT: 66 IN

## 2019-02-13 DIAGNOSIS — K52.9 NONINFECTIVE GASTROENTERITIS AND COLITIS, UNSPECIFIED: ICD-10-CM

## 2019-02-13 DIAGNOSIS — Z79.899 OTHER LONG TERM (CURRENT) DRUG THERAPY: ICD-10-CM

## 2019-02-13 DIAGNOSIS — K21.9 GASTRO-ESOPHAGEAL REFLUX DISEASE WITHOUT ESOPHAGITIS: ICD-10-CM

## 2019-02-13 DIAGNOSIS — K86.81 EXOCRINE PANCREATIC INSUFFICIENCY: ICD-10-CM

## 2019-02-13 DIAGNOSIS — K76.0 FATTY (CHANGE OF) LIVER, NOT ELSEWHERE CLASSIFIED: ICD-10-CM

## 2019-02-13 DIAGNOSIS — E78.5 HYPERLIPIDEMIA, UNSPECIFIED: ICD-10-CM

## 2019-02-13 DIAGNOSIS — Z01.812 ENCOUNTER FOR PREPROCEDURAL LABORATORY EXAMINATION: ICD-10-CM

## 2019-02-13 LAB
CBC SYSMEX: HEMATOCRIT: 42.2 % (ref 37–47)
CBC SYSMEX: HEMOGLOBIN: 14.1 G/DL (ref 14–18)
CBC SYSMEX: LYMPHS %: 45 % — HIGH (ref 20.5–40.5)
CBC SYSMEX: LYMPHS ABSOLUTE: 2.7 10*3U/L (ref 1.3–2.9)
CBC SYSMEX: MCH: 29.7 PG (ref 27–32)
CBC SYSMEX: MCHC: 33.4 G/DL (ref 28.5–35)
CBC SYSMEX: MCV: 88.8 FL (ref 84–100)
CBC SYSMEX: MONOS %: 4.4 % (ref 2–10)
CBC SYSMEX: MONOS ABSOLUTE: 0.3 10*3U/L (ref 0.3–3.8)
CBC SYSMEX: MPV: 10.9 FL (ref 8.3–11.9)
CBC SYSMEX: NEUT. %: 50.6 % (ref 43–67)
CBC SYSMEX: NEUT. ABSOLUTE: 3.1 10*3U/L (ref 2.2–4.8)
CBC SYSMEX: PLATELETS: 177 10*3U/L (ref 130–440)
CBC SYSMEX: RBC: 4.8 10*6U/L (ref 4.2–5.4)
CBC SYSMEX: RDW: 13.2 % (ref 11.5–15.5)
CBC SYSMEX: WBC: 6.1 10*3U/L (ref 4.5–10.5)
COMPLETE METABOLIC: ALBUMIN: 4.8 G/DL (ref 3.5–5.2)
COMPLETE METABOLIC: ALK. PHOS: 59 U/L (ref 40–150)
COMPLETE METABOLIC: ALT: 19 U/L (ref 0–55)
COMPLETE METABOLIC: AST: 24 U/L (ref 5–34)
COMPLETE METABOLIC: BUN: 11 MG/DL (ref 7–26)
COMPLETE METABOLIC: CALCIUM: 10.3 MG/DL (ref 8.4–10.3)
COMPLETE METABOLIC: CHLORIDE: 105 MMOL/L (ref 98–107)
COMPLETE METABOLIC: CO2: 31 MEQ/L — HIGH (ref 22–29)
COMPLETE METABOLIC: CREATININE: 0.82 MG/DL (ref 0.57–1.25)
COMPLETE METABOLIC: GFR - AFRICAN AMERICAN: 129.7 (ref 59–200)
COMPLETE METABOLIC: GFR - NON AFRICAN AMERICAN: 107 (ref 59–200)
COMPLETE METABOLIC: GLUCOSE: 79 MG/DL (ref 70–99)
COMPLETE METABOLIC: POTASSIUM: 5.2 MMOL/L (ref 3.5–5.3)
COMPLETE METABOLIC: SODIUM: 145 MMOL/L (ref 136–145)
COMPLETE METABOLIC: TOTAL BILIRUBIN: 0.4 MG/DL (ref 0.2–1.2)
COMPLETE METABOLIC: TOTAL PROTEIN: 6.8 G/DL (ref 6.4–8.3)

## 2019-02-13 PROCEDURE — 85025 COMPLETE CBC W/AUTO DIFF WBC: CPT

## 2019-02-13 PROCEDURE — 99213 OFFICE O/P EST LOW 20 MIN: CPT

## 2019-02-13 PROCEDURE — 80053 COMPREHEN METABOLIC PANEL: CPT

## 2019-02-13 RX ORDER — BISACODYL 5 MG
TABLET, DELAYED RELEASE (ENTERIC COATED) ORAL
Qty: 8 | Refills: 0 | Status: COMPLETED
Start: 2019-02-13 | End: 2019-04-22

## 2019-02-13 RX ORDER — ONDANSETRON HYDROCHLORIDE 4 MG/1
TABLET, FILM COATED ORAL
Qty: 2 | Refills: 0 | Status: COMPLETED
Start: 2019-02-13 | End: 2019-04-22

## 2019-02-13 RX ORDER — POLYETHYLENE GLYCOL 3350, SODIUM SULFATE ANHYDROUS, SODIUM BICARBONATE, SODIUM CHLORIDE, POTASSIUM CHLORIDE 236; 22.74; 6.74; 5.86; 2.97 G/4L; G/4L; G/4L; G/4L; G/4L
POWDER, FOR SOLUTION ORAL
Qty: 1 | Refills: 0 | Status: COMPLETED
Start: 2019-02-13 | End: 2019-04-19

## 2019-04-22 ENCOUNTER — AMBULATORY SURGICAL CENTER (OUTPATIENT)
Dept: URBAN - NONMETROPOLITAN AREA SURGERY 2 | Facility: SURGERY | Age: 49
End: 2019-04-22
Payer: MEDICARE

## 2019-04-22 VITALS
HEART RATE: 58 BPM | DIASTOLIC BLOOD PRESSURE: 74 MMHG | HEART RATE: 55 BPM | SYSTOLIC BLOOD PRESSURE: 135 MMHG | DIASTOLIC BLOOD PRESSURE: 53 MMHG | SYSTOLIC BLOOD PRESSURE: 102 MMHG | HEART RATE: 54 BPM | OXYGEN SATURATION: 99 % | DIASTOLIC BLOOD PRESSURE: 70 MMHG | RESPIRATION RATE: 18 BRPM | DIASTOLIC BLOOD PRESSURE: 66 MMHG | SYSTOLIC BLOOD PRESSURE: 113 MMHG | DIASTOLIC BLOOD PRESSURE: 63 MMHG | SYSTOLIC BLOOD PRESSURE: 111 MMHG | SYSTOLIC BLOOD PRESSURE: 134 MMHG | OXYGEN SATURATION: 97 % | HEART RATE: 57 BPM | DIASTOLIC BLOOD PRESSURE: 76 MMHG | SYSTOLIC BLOOD PRESSURE: 110 MMHG | DIASTOLIC BLOOD PRESSURE: 65 MMHG | HEIGHT: 66 IN | SYSTOLIC BLOOD PRESSURE: 112 MMHG | OXYGEN SATURATION: 98 % | WEIGHT: 162 LBS | SYSTOLIC BLOOD PRESSURE: 105 MMHG | RESPIRATION RATE: 20 BRPM | OXYGEN SATURATION: 96 % | HEART RATE: 87 BPM | DIASTOLIC BLOOD PRESSURE: 71 MMHG | HEART RATE: 56 BPM

## 2019-04-22 DIAGNOSIS — Z86.010 PERSONAL HISTORY OF COLONIC POLYPS: ICD-10-CM

## 2019-04-22 PROBLEM — Z83.71: Status: ACTIVE | Noted: 2019-04-22

## 2019-04-22 PROBLEM — K57.30 DVRTCLOS OF LG INT W/O PERFORATION OR ABSCESS W/O BLEEDING: Status: ACTIVE | Noted: 2019-04-22

## 2019-04-22 PROCEDURE — G0105 COLORECTAL SCRN; HI RISK IND: HCPCS | Performed by: INTERNAL MEDICINE

## 2019-04-22 RX ORDER — RANITIDINE 300 MG/1
TABLET ORAL
Qty: 180 | Refills: 1 | Status: COMPLETED
End: 2019-09-20

## 2019-04-22 RX ORDER — OMEPRAZOLE 40 MG/1
CAPSULE, DELAYED RELEASE ORAL
Qty: 30 | Refills: 0 | Status: ACTIVE
Start: 2017-08-07

## 2019-09-20 ENCOUNTER — OFFICE (OUTPATIENT)
Dept: URBAN - NONMETROPOLITAN AREA CLINIC 13 | Facility: CLINIC | Age: 49
End: 2019-09-20
Payer: MEDICAID

## 2019-09-20 ENCOUNTER — OFFICE (OUTPATIENT)
Dept: URBAN - NONMETROPOLITAN AREA CLINIC 13 | Facility: CLINIC | Age: 49
End: 2019-09-20
Payer: MEDICARE

## 2019-09-20 VITALS
OXYGEN SATURATION: 98 % | SYSTOLIC BLOOD PRESSURE: 140 MMHG | DIASTOLIC BLOOD PRESSURE: 85 MMHG | HEIGHT: 66 IN | HEART RATE: 57 BPM | WEIGHT: 166 LBS

## 2019-09-20 VITALS — HEIGHT: 66 IN

## 2019-09-20 DIAGNOSIS — Z12.5 ENCOUNTER FOR SCREENING FOR MALIGNANT NEOPLASM OF PROSTATE: ICD-10-CM

## 2019-09-20 DIAGNOSIS — K22.70 BARRETT'S ESOPHAGUS WITHOUT DYSPLASIA: ICD-10-CM

## 2019-09-20 DIAGNOSIS — E78.5 HYPERLIPIDEMIA, UNSPECIFIED: ICD-10-CM

## 2019-09-20 DIAGNOSIS — K76.0 FATTY (CHANGE OF) LIVER, NOT ELSEWHERE CLASSIFIED: ICD-10-CM

## 2019-09-20 DIAGNOSIS — K86.81 EXOCRINE PANCREATIC INSUFFICIENCY: ICD-10-CM

## 2019-09-20 DIAGNOSIS — K58.9 IRRITABLE BOWEL SYNDROME WITHOUT DIARRHEA: ICD-10-CM

## 2019-09-20 DIAGNOSIS — K21.9 GASTRO-ESOPHAGEAL REFLUX DISEASE WITHOUT ESOPHAGITIS: ICD-10-CM

## 2019-09-20 PROCEDURE — 76705 ECHO EXAM OF ABDOMEN: CPT | Mod: 59,TC | Performed by: INTERNAL MEDICINE

## 2019-09-20 PROCEDURE — 99213 OFFICE O/P EST LOW 20 MIN: CPT | Mod: 25

## 2019-09-20 PROCEDURE — 93976 VASCULAR STUDY: CPT | Mod: TC | Performed by: INTERNAL MEDICINE

## 2019-09-20 PROCEDURE — 76705 ECHO EXAM OF ABDOMEN: CPT | Mod: TC,59 | Performed by: INTERNAL MEDICINE

## 2019-09-20 RX ORDER — RANITIDINE 300 MG/1
TABLET ORAL
Qty: 180 | Refills: 1 | Status: COMPLETED
End: 2019-09-20

## 2019-09-20 RX ORDER — OMEPRAZOLE 40 MG/1
CAPSULE, DELAYED RELEASE ORAL
Qty: 30 | Refills: 0 | Status: ACTIVE
Start: 2017-08-07

## 2019-09-20 RX ORDER — FAMOTIDINE 40 MG/1
TABLET, FILM COATED ORAL
Qty: 30 | Refills: 0 | Status: ACTIVE
Start: 2019-09-20

## 2019-09-20 RX ORDER — PANCRELIPASE LIPASE, PANCRELIPASE PROTEASE, PANCRELIPASE AMYLASE 40000; 126000; 168000 [USP'U]/1; [USP'U]/1; [USP'U]/1
CAPSULE, DELAYED RELEASE ORAL
Qty: 90 | Refills: 0 | Status: ACTIVE
Start: 2018-10-24

## 2020-04-29 ENCOUNTER — OFFICE (OUTPATIENT)
Dept: URBAN - NONMETROPOLITAN AREA CLINIC 13 | Facility: CLINIC | Age: 50
End: 2020-04-29
Payer: MEDICAID

## 2020-04-29 VITALS
HEART RATE: 60 BPM | OXYGEN SATURATION: 96 % | WEIGHT: 176 LBS | DIASTOLIC BLOOD PRESSURE: 68 MMHG | SYSTOLIC BLOOD PRESSURE: 122 MMHG | HEIGHT: 66 IN

## 2020-04-29 DIAGNOSIS — K76.0 FATTY (CHANGE OF) LIVER, NOT ELSEWHERE CLASSIFIED: ICD-10-CM

## 2020-04-29 DIAGNOSIS — Z86.010 PERSONAL HISTORY OF COLONIC POLYPS: ICD-10-CM

## 2020-04-29 DIAGNOSIS — K22.70 BARRETT'S ESOPHAGUS WITHOUT DYSPLASIA: ICD-10-CM

## 2020-04-29 DIAGNOSIS — Z83.71 FAMILY HISTORY OF COLONIC POLYPS: ICD-10-CM

## 2020-04-29 DIAGNOSIS — K21.9 GASTRO-ESOPHAGEAL REFLUX DISEASE WITHOUT ESOPHAGITIS: ICD-10-CM

## 2020-04-29 DIAGNOSIS — K86.81 EXOCRINE PANCREATIC INSUFFICIENCY: ICD-10-CM

## 2020-04-29 PROCEDURE — 99213 OFFICE O/P EST LOW 20 MIN: CPT | Performed by: NURSE PRACTITIONER

## 2020-04-29 RX ORDER — OMEPRAZOLE 40 MG/1
CAPSULE, DELAYED RELEASE ORAL
Qty: 30 | Refills: 0 | Status: ACTIVE
Start: 2017-08-07

## 2020-04-29 RX ORDER — VITAMIN B COMPLEX
TABLET ORAL
Qty: 60 | Refills: 5 | Status: ACTIVE
Start: 2016-04-20

## 2020-04-29 RX ORDER — PANCRELIPASE LIPASE, PANCRELIPASE PROTEASE, PANCRELIPASE AMYLASE 40000; 126000; 168000 [USP'U]/1; [USP'U]/1; [USP'U]/1
CAPSULE, DELAYED RELEASE ORAL
Qty: 90 | Refills: 0 | Status: ACTIVE
Start: 2018-10-24

## 2020-04-29 RX ORDER — DOCUSATE SODIUM AND SENNOSIDES 50; 8.6 MG/1; MG/1
TABLET, FILM COATED ORAL
Qty: 60 | Refills: 5 | Status: ACTIVE
Start: 2016-04-18

## 2020-04-29 RX ORDER — FAMOTIDINE 40 MG/1
TABLET, FILM COATED ORAL
Qty: 30 | Refills: 0 | Status: ACTIVE
Start: 2019-09-20

## 2020-09-15 ENCOUNTER — OFFICE (OUTPATIENT)
Dept: URBAN - NONMETROPOLITAN AREA CLINIC 13 | Facility: CLINIC | Age: 50
End: 2020-09-15
Payer: MEDICARE

## 2020-09-15 ENCOUNTER — OFFICE (OUTPATIENT)
Dept: URBAN - NONMETROPOLITAN AREA CLINIC 13 | Facility: CLINIC | Age: 50
End: 2020-09-15
Payer: MEDICAID

## 2020-09-15 VITALS
SYSTOLIC BLOOD PRESSURE: 139 MMHG | HEART RATE: 68 BPM | DIASTOLIC BLOOD PRESSURE: 88 MMHG | WEIGHT: 170 LBS | HEIGHT: 66 IN | OXYGEN SATURATION: 97 %

## 2020-09-15 VITALS — HEIGHT: 66 IN

## 2020-09-15 DIAGNOSIS — K76.0 FATTY (CHANGE OF) LIVER, NOT ELSEWHERE CLASSIFIED: ICD-10-CM

## 2020-09-15 DIAGNOSIS — Z79.899 OTHER LONG TERM (CURRENT) DRUG THERAPY: ICD-10-CM

## 2020-09-15 DIAGNOSIS — K86.81 EXOCRINE PANCREATIC INSUFFICIENCY: ICD-10-CM

## 2020-09-15 DIAGNOSIS — E11.9 TYPE 2 DIABETES MELLITUS WITHOUT COMPLICATIONS: ICD-10-CM

## 2020-09-15 DIAGNOSIS — K52.9 NONINFECTIVE GASTROENTERITIS AND COLITIS, UNSPECIFIED: ICD-10-CM

## 2020-09-15 LAB
CBC WITH WBC (DIFF): ACANTHOCYTE: NORMAL
CBC WITH WBC (DIFF): AGRANULAR NEUTROPHIL: NORMAL
CBC WITH WBC (DIFF): ANISOCYTOSIS: NORMAL
CBC WITH WBC (DIFF): ATYPICAL LYMPHOCYTE: NORMAL
CBC WITH WBC (DIFF): AUER RODS: NORMAL
CBC WITH WBC (DIFF): BAND: NORMAL
CBC WITH WBC (DIFF): BASOPHIL: 1 % (ref 0–1)
CBC WITH WBC (DIFF): BASOPHILIC STIPPLING: NORMAL
CBC WITH WBC (DIFF): BI-LOBED NEUTROPHIL: NORMAL
CBC WITH WBC (DIFF): BLAST: NORMAL
CBC WITH WBC (DIFF): BURR CELL: NORMAL
CBC WITH WBC (DIFF): DIMORPHIC RBC POPULATION: NORMAL
CBC WITH WBC (DIFF): DOHLE BODIES: NORMAL
CBC WITH WBC (DIFF): ELLIPTOCYTE: NORMAL
CBC WITH WBC (DIFF): EOSINOPHIL: 3 % (ref 0–5)
CBC WITH WBC (DIFF): GIANT PLATELET: NORMAL
CBC WITH WBC (DIFF): HEMATOCRIT: 39.5 % — LOW (ref 41–53)
CBC WITH WBC (DIFF): HEMOGLOBIN: 12.3 G/DL — LOW (ref 14–18)
CBC WITH WBC (DIFF): HOWELL JOLLY BODIES: NORMAL
CBC WITH WBC (DIFF): HYPERSEGMENTED NEUTROPHIL: NORMAL
CBC WITH WBC (DIFF): HYPOCHROMIA: NORMAL
CBC WITH WBC (DIFF): HYPOGRANULAR NEUTROPHIL: NORMAL
CBC WITH WBC (DIFF): IMMATURE GRANULOCYTES: 0 % (ref 0–1)
CBC WITH WBC (DIFF): LARGE PLATELET: NORMAL
CBC WITH WBC (DIFF): LYMPHOCYTE: 26 % (ref 20–40)
CBC WITH WBC (DIFF): MACROCYTOSIS: NORMAL
CBC WITH WBC (DIFF): MEAN CELL HEMOGLOBIN CONCENTRATION: 31.1 G/DL — LOW (ref 33–37)
CBC WITH WBC (DIFF): MEAN CELL HEMOGLOBIN: 29.1 PG (ref 27–31)
CBC WITH WBC (DIFF): MEAN CELL VOLUME: 93 FL (ref 84–100)
CBC WITH WBC (DIFF): MEAN PLATELET VOLUME: 10.7 FL (ref 8.3–11.9)
CBC WITH WBC (DIFF): METAMYELOCYTE: NORMAL
CBC WITH WBC (DIFF): MICROCYTOSIS: NORMAL
CBC WITH WBC (DIFF): MIX CELLS: NORMAL
CBC WITH WBC (DIFF): MONOCYTE: 8 % (ref 1–10)
CBC WITH WBC (DIFF): MYELOCYTE: NORMAL
CBC WITH WBC (DIFF): NEUTROPHIL SEGMENTED: 62 % (ref 50–70)
CBC WITH WBC (DIFF): NOTE: NORMAL
CBC WITH WBC (DIFF): NUCLEATED RBC: 0 /100WBC (ref 0–0)
CBC WITH WBC (DIFF): OVALOCYTE: NORMAL
CBC WITH WBC (DIFF): PAPPENHEIMER BODIES: NORMAL
CBC WITH WBC (DIFF): PATHOLOGIST INTERPRETATION: NORMAL
CBC WITH WBC (DIFF): PLATELET CLUMPS: NORMAL
CBC WITH WBC (DIFF): PLATELET COUNT: 184 /NL (ref 140–440)
CBC WITH WBC (DIFF): PLT SATELLITOSIS: NORMAL
CBC WITH WBC (DIFF): POIKILOCYTOSIS: NORMAL
CBC WITH WBC (DIFF): POLYCHROMASIA: NORMAL
CBC WITH WBC (DIFF): PROMYELOCYTE: NORMAL
CBC WITH WBC (DIFF): RBC MORPHOLOGY: NORMAL
CBC WITH WBC (DIFF): REACT LYMPH ABS MAN: NORMAL
CBC WITH WBC (DIFF): REACTIVE LYMPHOCYTE: NORMAL
CBC WITH WBC (DIFF): RED BLOOD CELL: 4.23 /PL — LOW (ref 4.7–6.1)
CBC WITH WBC (DIFF): RED CELL DIAMETER WIDTH: 47 FL (ref 35–48)
CBC WITH WBC (DIFF): ROULEAUX RBC: NORMAL
CBC WITH WBC (DIFF): SCHISTOCYTE: NORMAL
CBC WITH WBC (DIFF): SICKLE CELL: NORMAL
CBC WITH WBC (DIFF): SMUDGE CELLS: NORMAL
CBC WITH WBC (DIFF): SPHEROCYTE: NORMAL
CBC WITH WBC (DIFF): STOMATOCYTE: NORMAL
CBC WITH WBC (DIFF): TARGET CELL: NORMAL
CBC WITH WBC (DIFF): TEAR DROP CELL: NORMAL
CBC WITH WBC (DIFF): TOXIC GRANULATION: NORMAL
CBC WITH WBC (DIFF): UNCLASSIFIED CELL: NORMAL
CBC WITH WBC (DIFF): VACUOLATED NEUTROPHIL: NORMAL
CBC WITH WBC (DIFF): WBC MORPHOLOGY: NORMAL
CBC WITH WBC (DIFF): WHITE BLOOD CELL: 8.2 /NL (ref 4.8–11)
COMPREHENSIVE METABOLIC PANEL: ALBUMIN: 4.3 G/DL (ref 3.5–4.8)
COMPREHENSIVE METABOLIC PANEL: ALKALINE PHOSPHATASE: 75 UNITS/L (ref 36–126)
COMPREHENSIVE METABOLIC PANEL: ALT: 20 UNITS/L (ref ?–49)
COMPREHENSIVE METABOLIC PANEL: ANION GAP: 9 MMOL/L (ref 7–16)
COMPREHENSIVE METABOLIC PANEL: AST: 34 UNITS/L (ref 17–59)
COMPREHENSIVE METABOLIC PANEL: BILIRUBIN, TOTAL: 0.6 MG/DL (ref 0.2–1.3)
COMPREHENSIVE METABOLIC PANEL: BUN/CREATININE RATIO: 20.3
COMPREHENSIVE METABOLIC PANEL: CALCIUM: 9.8 MG/DL (ref 8.4–10.2)
COMPREHENSIVE METABOLIC PANEL: CARBON DIOXIDE: 28 MMOL/L (ref 22–30)
COMPREHENSIVE METABOLIC PANEL: CHLORIDE: 104 MMOL/L (ref 98–107)
COMPREHENSIVE METABOLIC PANEL: CREATININE: 0.69 MG/DL (ref 0.66–1.25)
COMPREHENSIVE METABOLIC PANEL: GLUCOSE: 119 MG/DL — HIGH (ref 75–110)
COMPREHENSIVE METABOLIC PANEL: POTASSIUM: 4.3 MMOL/L (ref 3.5–5.3)
COMPREHENSIVE METABOLIC PANEL: PROTEIN, TOTAL, SERUM: 6.3 G/DL (ref 6.3–8.2)
COMPREHENSIVE METABOLIC PANEL: SODIUM: 141 MMOL/L (ref 137–145)
COMPREHENSIVE METABOLIC PANEL: UREA NITROGEN (BUN): 14 MG/DL (ref 9–20)
GFR: EGFR AFRICAN AMERICAN: >60 ML/MIN/1.73M2
GFR: EGFR NON-AFR.AMERICAN: >60 ML/MIN/1.73M2

## 2020-09-15 PROCEDURE — 99214 OFFICE O/P EST MOD 30 MIN: CPT | Mod: 25 | Performed by: NURSE PRACTITIONER

## 2020-09-15 PROCEDURE — 76705 ECHO EXAM OF ABDOMEN: CPT | Mod: TC | Performed by: NURSE PRACTITIONER

## 2020-09-15 RX ORDER — FAMOTIDINE 40 MG/1
TABLET, FILM COATED ORAL
Qty: 30 | Refills: 0 | Status: ACTIVE
Start: 2019-09-20

## 2020-09-15 RX ORDER — PANCRELIPASE LIPASE, PANCRELIPASE PROTEASE, PANCRELIPASE AMYLASE 40000; 126000; 168000 [USP'U]/1; [USP'U]/1; [USP'U]/1
CAPSULE, DELAYED RELEASE ORAL
Qty: 90 | Refills: 0 | Status: ACTIVE
Start: 2018-10-24

## 2020-09-15 RX ORDER — OMEPRAZOLE 40 MG/1
CAPSULE, DELAYED RELEASE ORAL
Qty: 30 | Refills: 0 | Status: ACTIVE
Start: 2017-08-07

## 2020-09-15 RX ORDER — VITAMIN B COMPLEX
TABLET ORAL
Qty: 60 | Refills: 5 | Status: ACTIVE
Start: 2016-04-20

## 2020-09-15 RX ORDER — DOCUSATE SODIUM AND SENNOSIDES 50; 8.6 MG/1; MG/1
TABLET, FILM COATED ORAL
Qty: 60 | Refills: 5 | Status: ACTIVE
Start: 2016-04-18